# Patient Record
Sex: FEMALE | Race: WHITE | Employment: FULL TIME
[De-identification: names, ages, dates, MRNs, and addresses within clinical notes are randomized per-mention and may not be internally consistent; named-entity substitution may affect disease eponyms.]

---

## 2024-02-14 ENCOUNTER — NURSE TRIAGE (OUTPATIENT)
Dept: OTHER | Facility: CLINIC | Age: 29
End: 2024-02-14

## 2024-02-14 NOTE — TELEPHONE ENCOUNTER
Location of patient: [place]    Received call from Kasie at [facility]; Patient with Red Flag Complaint requesting to establish care with [facility].    Subjective: Caller states \"seen in ER for dizzy, nausea, High blood pressure\"     Current Symptoms: labs and urine were normal.  Got IV fluids. Blood pressure in ER was 149/86.  \"I feel really hot, but no temperature, dizzy and nauseated\"  Still able to work.      Onset: 4 weeks ago; worsening    Pain Severity: 0/10; N/A; none    Recommended disposition: See in Office Within 2 Weeks    Care advice provided, patient verbalizes understanding; denies any other questions or concerns; instructed to call back for any new or worsening symptoms.    Patient/Caller agrees with recommended disposition; writer provided warm transfer to Connie at [facility] for appointment scheduling    Attention Provider:  Thank you for allowing me to participate in the care of your patient.  The patient was connected to triage in response to information provided to the [facility].  Please do not respond through this encounter as the response is not directed to a shared pool.      Reason for Disposition   Dizziness not present now, but is a chronic symptom (recurrent or ongoing AND lasting > 4 weeks)    Protocols used: Dizziness-ADULT-[place]

## 2024-02-16 ENCOUNTER — OFFICE VISIT (OUTPATIENT)
Dept: PRIMARY CARE CLINIC | Age: 29
End: 2024-02-16
Payer: COMMERCIAL

## 2024-02-16 VITALS
WEIGHT: 202.2 LBS | DIASTOLIC BLOOD PRESSURE: 88 MMHG | TEMPERATURE: 98.8 F | OXYGEN SATURATION: 99 % | HEIGHT: 67 IN | BODY MASS INDEX: 31.74 KG/M2 | SYSTOLIC BLOOD PRESSURE: 126 MMHG | HEART RATE: 98 BPM

## 2024-02-16 DIAGNOSIS — Z72.0 VAPES NICOTINE CONTAINING SUBSTANCE: ICD-10-CM

## 2024-02-16 DIAGNOSIS — F32.A ANXIETY AND DEPRESSION: ICD-10-CM

## 2024-02-16 DIAGNOSIS — Z76.89 ENCOUNTER TO ESTABLISH CARE: Primary | ICD-10-CM

## 2024-02-16 DIAGNOSIS — F41.9 ANXIETY AND DEPRESSION: ICD-10-CM

## 2024-02-16 PROCEDURE — 99203 OFFICE O/P NEW LOW 30 MIN: CPT | Performed by: NURSE PRACTITIONER

## 2024-02-16 RX ORDER — DULOXETIN HYDROCHLORIDE 30 MG/1
30 CAPSULE, DELAYED RELEASE ORAL DAILY
Qty: 30 CAPSULE | Refills: 0 | Status: SHIPPED | OUTPATIENT
Start: 2024-02-16

## 2024-02-16 NOTE — PATIENT INSTRUCTIONS
Go to er for any thoughts of self harm or harming others  Increase fluids you need to be voiding clear yellow urine every 4 hours  Gt up slowly   No etoh with medications   Pt is agreeable to this plan

## 2024-02-16 NOTE — PROGRESS NOTES
PROGRESS NOTE  Date of Service:  2024    SUBJECTIVE:  Patient ID: Parker Murray is a 28 y.o. 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  Vapes daily  Stopped etoh 2 weeks ago   No rehab  No DUI  Some reduction in anxiety since stopping ETOH  Etoh black out 3-4 times a year 4 years   Etoh I the home  No thoughts of self harm or harming others  No previous attempts in life  Exercise-former daily not for 1.5years  Plans to begin   Diet-all food groups   Weight  wants to decreased calories and eat healthy  150 smallest   No eating disorders  Former use of meds for anxiety and depression   Smoke extinguisher in home  No co2 and extinguisher  Gun safety discussed no guns  Sleep pattern- varies  Caffeine one cup of coffee  Not currently sexually active   OBC daily from ob/gyn  Utd vision and eye exam   No additional concerns   No change in skin mole or lesion wears sunscreen   Work schedule varies shift work     History reviewed. No pertinent past medical history.   History reviewed. No pertinent surgical history.   Social History     Tobacco Use    Smoking status: Former     Current packs/day: 0.00     Average packs/day: 0.5 packs/day for 9.0 years (4.5 ttl pk-yrs)     Types: Cigarettes     Start date:      Quit date:      Years since quittin.1    Smokeless tobacco: Current   Substance Use Topics    Alcohol use: Never      Family History   Problem Relation Age of Onset    Amyotrophic lateral sclerosis Mother     Lung Cancer Paternal Grandmother      No current outpatient medications on file prior to visit.     No current facility-administered medications on file prior to visit.       No Known Allergies     Review of Systems   Constitutional:

## 2024-02-16 NOTE — ASSESSMENT & PLAN NOTE
Schedule appt with [facility]   If you would like referral let office know   Go to er for any thoughts of self harm or harming others   Pt is agreeable to this plan

## 2024-02-16 NOTE — ASSESSMENT & PLAN NOTE
We will talk in 2 weeks   Call with any issues or concerns   After medication adjustment every 6 months in office

## 2024-03-06 ENCOUNTER — TELEPHONE (OUTPATIENT)
Dept: PRIMARY CARE CLINIC | Age: 29
End: 2024-03-06

## 2024-03-06 NOTE — TELEPHONE ENCOUNTER
Pt says she is having a lot of chest tightness that comes and goes. She says she has been like this for about three days. She is wondering if it is anxiety. She says she shakes pretty badly when she experiences the chest tightness. She says that she took a couple deep breaths and she was able to calm herself down. The symptoms subsided. She says she woke up last night with the chest tightness and it felt hot. She says she was going to drive to the ER but by the time she almost got there, her symptoms had passed. She says it lasted for about 15 min and then it stopped. She made a tentative appt for 3/8/24.

## 2024-03-07 NOTE — TELEPHONE ENCOUNTER
Patient has been notified and verbalized her understanding.     No further action is needed for this encounter

## 2024-03-08 ENCOUNTER — OFFICE VISIT (OUTPATIENT)
Dept: PRIMARY CARE CLINIC | Age: 29
End: 2024-03-08
Payer: COMMERCIAL

## 2024-03-08 VITALS
BODY MASS INDEX: 31.08 KG/M2 | TEMPERATURE: 97.8 F | DIASTOLIC BLOOD PRESSURE: 74 MMHG | HEART RATE: 82 BPM | OXYGEN SATURATION: 99 % | SYSTOLIC BLOOD PRESSURE: 112 MMHG | WEIGHT: 198 LBS | HEIGHT: 67 IN | RESPIRATION RATE: 16 BRPM

## 2024-03-08 DIAGNOSIS — Z72.0 VAPES NICOTINE CONTAINING SUBSTANCE: ICD-10-CM

## 2024-03-08 DIAGNOSIS — R42 LIGHTHEADEDNESS: Primary | ICD-10-CM

## 2024-03-08 LAB
CHP ED QC CHECK: NORMAL
GLUCOSE BLD-MCNC: 73 MG/DL

## 2024-03-08 PROCEDURE — 82962 GLUCOSE BLOOD TEST: CPT | Performed by: NURSE PRACTITIONER

## 2024-03-08 PROCEDURE — 99213 OFFICE O/P EST LOW 20 MIN: CPT | Performed by: NURSE PRACTITIONER

## 2024-03-08 ASSESSMENT — ENCOUNTER SYMPTOMS
SHORTNESS OF BREATH: 0
NAUSEA: 0
VOMITING: 0
DIARRHEA: 0

## 2024-03-08 NOTE — ASSESSMENT & PLAN NOTE
Stop vaping  Bg level was low end of normal be sure to eat 1200 calories minimum with enough protein daily  Stay hydrated you need to be voiding clear yellow urine every 4 hours during the day  IF you have sob cp difficulty breathing increased lightheadedness go to er next steps may be referral to cardiology  Pt is agreeable to this plan

## 2024-03-08 NOTE — PROGRESS NOTES
PROGRESS NOTE  Date of Service:  3/8/2024    SUBJECTIVE:  Patient ID: Parker Murray is a 28 y.o. female    HPI: increaed lightheaded and dizziness has not fallen no fainting no loc  No concerns driving  Reports chest tightness. Few days ago lying flat chest felt hot felt like she couldn't breath began to breath heavy .  Drove to the ER few days go symptoms were relieved she did not go in the er  Symptoms last 15 minutes one time previous episode lasted same amt of time had nausea with this   No visual changes   Last week had a headache blurry vision X1 with nausea slept and then felt better.  Pt is not taking any medications  Recently stopped vaping   Worsening of anxiety afraid something is wrong      History reviewed. No pertinent past medical history.   History reviewed. No pertinent surgical history.   Social History     Tobacco Use    Smoking status: Former     Current packs/day: 0.00     Average packs/day: 0.5 packs/day for 9.0 years (4.5 ttl pk-yrs)     Types: Cigarettes     Start date:      Quit date:      Years since quittin.1    Smokeless tobacco: Current   Substance Use Topics    Alcohol use: Never      Family History   Problem Relation Age of Onset    Amyotrophic lateral sclerosis Mother     Lung Cancer Paternal Grandmother      Current Outpatient Medications on File Prior to Visit   Medication Sig Dispense Refill    DULoxetine (CYMBALTA) 30 MG extended release capsule Take 1 capsule by mouth daily (Patient not taking: Reported on 3/8/2024) 30 capsule 0     No current facility-administered medications on file prior to visit.       No Known Allergies     Review of Systems   HENT:  Negative for congestion.    Eyes:  Negative for visual disturbance.   Respiratory:  Negative for shortness of breath.    Cardiovascular:  Negative for chest pain and palpitations.   Gastrointestinal:  Negative for diarrhea, nausea and vomiting.   Neurological:  Positive for light-headedness. Negative for

## 2024-03-26 ENCOUNTER — OFFICE VISIT (OUTPATIENT)
Dept: FAMILY MEDICINE CLINIC | Age: 29
End: 2024-03-26
Payer: COMMERCIAL

## 2024-03-26 VITALS
WEIGHT: 196.2 LBS | HEIGHT: 67 IN | SYSTOLIC BLOOD PRESSURE: 118 MMHG | TEMPERATURE: 98.2 F | BODY MASS INDEX: 30.79 KG/M2 | DIASTOLIC BLOOD PRESSURE: 78 MMHG | HEART RATE: 79 BPM | OXYGEN SATURATION: 99 %

## 2024-03-26 DIAGNOSIS — Z23 NEED FOR VACCINATION: ICD-10-CM

## 2024-03-26 DIAGNOSIS — F41.9 ANXIETY: ICD-10-CM

## 2024-03-26 DIAGNOSIS — R42 DIZZINESS: ICD-10-CM

## 2024-03-26 DIAGNOSIS — Z00.00 PHYSICAL EXAM: Primary | ICD-10-CM

## 2024-03-26 PROCEDURE — 99395 PREV VISIT EST AGE 18-39: CPT | Performed by: FAMILY MEDICINE

## 2024-03-26 PROCEDURE — 90471 IMMUNIZATION ADMIN: CPT | Performed by: FAMILY MEDICINE

## 2024-03-26 PROCEDURE — 90715 TDAP VACCINE 7 YRS/> IM: CPT | Performed by: FAMILY MEDICINE

## 2024-03-26 RX ORDER — HYDROXYZINE PAMOATE 25 MG/1
25 CAPSULE ORAL 3 TIMES DAILY PRN
Qty: 30 CAPSULE | Refills: 0 | Status: SHIPPED | OUTPATIENT
Start: 2024-03-26 | End: 2024-04-25

## 2024-03-26 NOTE — PROGRESS NOTES
Chief Complaint: New Patient and Dizziness (lightheadedness & dizziness occurs several times throughout the day w/nausea )       HPI: She is here to establish care.    She has been having intermittent dizziness.  Usually happens when she turns her head or bends over.  She at times feels nauseous and palpitation associated with it  Denies any seizure-like activities.  She was in the ER on 2024.  She had a normal EKG and was discharged with reassurance.  However she continues to have the symptoms.    She was vaping and currently discontinued.  She has cut down completely on alcohol.  Denies having any history of drug abuse.    She also has history of anxiety.  However has been able to function without need of medication.  In the past used medication but has not taken any for long-term.    Her menstrual cycles are regular.  Never had any GYN exam done.  Denies any heavy menstrual cycle bleeding.    She is due for Tdap      Patient's problem list, medications, allergies, past medical, surgical, social and family histories were reviewed and updated as appropriate.     Current Outpatient Medications   Medication Sig Dispense Refill    hydrOXYzine pamoate (VISTARIL) 25 MG capsule Take 1 capsule by mouth 3 times daily as needed for Anxiety 30 capsule 0     No current facility-administered medications for this visit.       Social History     Tobacco Use    Smoking status: Former     Current packs/day: 0.00     Average packs/day: 0.5 packs/day for 9.0 years (4.5 ttl pk-yrs)     Types: Cigarettes     Start date:      Quit date:      Years since quittin.2    Smokeless tobacco: Current   Substance Use Topics    Alcohol use: Never            Review of Systems:  Constitutional: Negative for appetite change, fatigue, fever and unexpected weight change.   HENT: Negative   Eyes: Negative   Respiratory: Negative for cough, chest tightness, shortness of breath and wheezing.    Cardiovascular: Negative for chest pain,

## 2024-03-27 DIAGNOSIS — Z00.00 PHYSICAL EXAM: ICD-10-CM

## 2024-03-27 DIAGNOSIS — R42 DIZZINESS: ICD-10-CM

## 2024-03-27 LAB
BASOPHILS # BLD: 0.1 K/UL (ref 0–0.2)
BASOPHILS NFR BLD: 0.8 %
DEPRECATED RDW RBC AUTO: 12.2 % (ref 12.4–15.4)
EOSINOPHIL # BLD: 0.1 K/UL (ref 0–0.6)
EOSINOPHIL NFR BLD: 0.9 %
HCT VFR BLD AUTO: 41.9 % (ref 36–48)
HGB BLD-MCNC: 14.4 G/DL (ref 12–16)
LYMPHOCYTES # BLD: 1.6 K/UL (ref 1–5.1)
LYMPHOCYTES NFR BLD: 21.8 %
MCH RBC QN AUTO: 30.3 PG (ref 26–34)
MCHC RBC AUTO-ENTMCNC: 34.4 G/DL (ref 31–36)
MCV RBC AUTO: 87.9 FL (ref 80–100)
MONOCYTES # BLD: 0.3 K/UL (ref 0–1.3)
MONOCYTES NFR BLD: 4.7 %
NEUTROPHILS # BLD: 5.2 K/UL (ref 1.7–7.7)
NEUTROPHILS NFR BLD: 71.8 %
PLATELET # BLD AUTO: 307 K/UL (ref 135–450)
PMV BLD AUTO: 8.4 FL (ref 5–10.5)
RBC # BLD AUTO: 4.77 M/UL (ref 4–5.2)
WBC # BLD AUTO: 7.3 K/UL (ref 4–11)

## 2024-03-28 LAB
ANION GAP SERPL CALCULATED.3IONS-SCNC: 11 MMOL/L (ref 3–16)
BUN SERPL-MCNC: 9 MG/DL (ref 7–20)
CALCIUM SERPL-MCNC: 9.8 MG/DL (ref 8.3–10.6)
CHLORIDE SERPL-SCNC: 106 MMOL/L (ref 99–110)
CHOLEST SERPL-MCNC: 189 MG/DL (ref 0–199)
CO2 SERPL-SCNC: 25 MMOL/L (ref 21–32)
CREAT SERPL-MCNC: 0.7 MG/DL (ref 0.6–1.1)
GFR SERPLBLD CREATININE-BSD FMLA CKD-EPI: >90 ML/MIN/{1.73_M2}
GLUCOSE SERPL-MCNC: 89 MG/DL (ref 70–99)
HDLC SERPL-MCNC: 65 MG/DL (ref 40–60)
LDLC SERPL CALC-MCNC: 109 MG/DL
POTASSIUM SERPL-SCNC: 4.8 MMOL/L (ref 3.5–5.1)
SODIUM SERPL-SCNC: 142 MMOL/L (ref 136–145)
TRIGL SERPL-MCNC: 75 MG/DL (ref 0–150)
TSH SERPL DL<=0.005 MIU/L-ACNC: 1.43 UIU/ML (ref 0.27–4.2)
VLDLC SERPL CALC-MCNC: 15 MG/DL

## 2024-04-01 ENCOUNTER — HOSPITAL ENCOUNTER (OUTPATIENT)
Age: 29
Discharge: HOME | End: 2024-04-01
Payer: COMMERCIAL

## 2024-04-01 DIAGNOSIS — R42 DIZZINESS: ICD-10-CM

## 2024-04-01 PROCEDURE — 70551 MRI BRAIN STEM W/O DYE: CPT

## 2024-04-01 PROCEDURE — 93225 XTRNL ECG REC<48 HRS REC: CPT

## 2024-04-01 PROCEDURE — 93226 XTRNL ECG REC<48 HR SCAN A/R: CPT

## 2024-04-01 NOTE — PROGRESS NOTES
PATIENT EDUCATED ON 48 HOUR HOLTER MONITOR. PATIENT VERBALIZES UNDERSTANDING. ALL QUESTIONS AND CONCERNS ANSWERED    MONITOR: A  TIME: 1300

## 2024-04-03 DIAGNOSIS — R42 DIZZINESS: Primary | ICD-10-CM

## 2024-04-03 NOTE — RESULT ENCOUNTER NOTE
According to imaging department, this order would need to be changed to MRI BRAIN WITH OR WITHOUT CONTRAST.     Please correct order.

## 2024-04-05 ENCOUNTER — PATIENT MESSAGE (OUTPATIENT)
Dept: FAMILY MEDICINE CLINIC | Age: 29
End: 2024-04-05

## 2024-04-05 LAB
ACQUISITION DURATION: NORMAL S
AVERAGE HEART RATE: 73 BPM
EKG DIAGNOSIS: NORMAL
HOLTER MAX HEART RATE: 127 BPM
HOOKUP DATE: NORMAL
HOOKUP TIME: NORMAL
MAX HEART RATE TIME/DATE: NORMAL
MIN HEART RATE TIME/DATE: NORMAL
MIN HEART RATE: 50 BPM
NUMBER OF QRS COMPLEXES: NORMAL
NUMBER OF SUPRAVENTRICULAR COUPLETS: 0
NUMBER OF SUPRAVENTRICULAR ECTOPICS: 2
NUMBER OF SUPRAVENTRICULAR ISOLATED BEATS: 2
NUMBER OF VENTRICULAR BIGEMINAL CYCLES: 0
NUMBER OF VENTRICULAR COUPLETS: 0
NUMBER OF VENTRICULAR ECTOPICS: 5

## 2024-04-05 NOTE — TELEPHONE ENCOUNTER
From: Parker Murray  To: Dr. Keli Elizabeth  Sent: 4/5/2024 12:16 PM EDT  Subject: Medication     Hi, I am wondering if I can try daily medication for anxiety. I looked into Citalopram. Do I need to make an office visit to discuss medications?

## 2024-04-05 NOTE — TELEPHONE ENCOUNTER
Please advise, thank you!     Patient Phone Number: [phone] (home)     Last appt: 3/26/2024 -- PROVIDERS NOTE BELOW:  She also has history of anxiety. However has been able to function without need of medication. In the past used medication but has not taken any for long-term.     Next appt: Visit date not found

## 2024-04-10 ENCOUNTER — HOSPITAL ENCOUNTER (OUTPATIENT)
Age: 29
Discharge: HOME | End: 2024-04-10
Payer: COMMERCIAL

## 2024-04-10 DIAGNOSIS — R42 DIZZINESS: ICD-10-CM

## 2024-04-10 PROCEDURE — 6360000004 HC RX CONTRAST MEDICATION: Performed by: FAMILY MEDICINE

## 2024-04-10 PROCEDURE — A9579 GAD-BASE MR CONTRAST NOS,1ML: HCPCS | Performed by: FAMILY MEDICINE

## 2024-04-10 PROCEDURE — 70553 MRI BRAIN STEM W/O & W/DYE: CPT

## 2024-04-10 RX ADMIN — GADOTERIDOL 18 ML: 279.3 INJECTION, SOLUTION INTRAVENOUS at 13:31

## 2024-04-11 ENCOUNTER — OFFICE VISIT (OUTPATIENT)
Dept: FAMILY MEDICINE CLINIC | Age: 29
End: 2024-04-11
Payer: COMMERCIAL

## 2024-04-11 VITALS
SYSTOLIC BLOOD PRESSURE: 122 MMHG | OXYGEN SATURATION: 100 % | TEMPERATURE: 97.3 F | HEART RATE: 85 BPM | HEIGHT: 67 IN | BODY MASS INDEX: 29.88 KG/M2 | WEIGHT: 190.4 LBS | DIASTOLIC BLOOD PRESSURE: 80 MMHG

## 2024-04-11 DIAGNOSIS — G93.9 RIGHT FRONTAL LOBE LESION: Primary | ICD-10-CM

## 2024-04-11 PROCEDURE — 99213 OFFICE O/P EST LOW 20 MIN: CPT | Performed by: FAMILY MEDICINE

## 2024-04-11 RX ORDER — ONDANSETRON 4 MG/1
4 TABLET, FILM COATED ORAL EVERY 8 HOURS PRN
Qty: 30 TABLET | Refills: 0 | Status: SHIPPED | OUTPATIENT
Start: 2024-04-11 | End: 2024-05-11

## 2024-04-11 ASSESSMENT — PATIENT HEALTH QUESTIONNAIRE - PHQ9
9. THOUGHTS THAT YOU WOULD BE BETTER OFF DEAD, OR OF HURTING YOURSELF: NOT AT ALL
4. FEELING TIRED OR HAVING LITTLE ENERGY: NEARLY EVERY DAY
2. FEELING DOWN, DEPRESSED OR HOPELESS: NEARLY EVERY DAY
7. TROUBLE CONCENTRATING ON THINGS, SUCH AS READING THE NEWSPAPER OR WATCHING TELEVISION: MORE THAN HALF THE DAYS
SUM OF ALL RESPONSES TO PHQ QUESTIONS 1-9: 19
SUM OF ALL RESPONSES TO PHQ9 QUESTIONS 1 & 2: 6
3. TROUBLE FALLING OR STAYING ASLEEP: NEARLY EVERY DAY
6. FEELING BAD ABOUT YOURSELF - OR THAT YOU ARE A FAILURE OR HAVE LET YOURSELF OR YOUR FAMILY DOWN: SEVERAL DAYS
5. POOR APPETITE OR OVEREATING: NEARLY EVERY DAY
SUM OF ALL RESPONSES TO PHQ QUESTIONS 1-9: 19
8. MOVING OR SPEAKING SO SLOWLY THAT OTHER PEOPLE COULD HAVE NOTICED. OR THE OPPOSITE, BEING SO FIGETY OR RESTLESS THAT YOU HAVE BEEN MOVING AROUND A LOT MORE THAN USUAL: SEVERAL DAYS
SUM OF ALL RESPONSES TO PHQ QUESTIONS 1-9: 19
10. IF YOU CHECKED OFF ANY PROBLEMS, HOW DIFFICULT HAVE THESE PROBLEMS MADE IT FOR YOU TO DO YOUR WORK, TAKE CARE OF THINGS AT HOME, OR GET ALONG WITH OTHER PEOPLE: EXTREMELY DIFFICULT
1. LITTLE INTEREST OR PLEASURE IN DOING THINGS: NEARLY EVERY DAY
SUM OF ALL RESPONSES TO PHQ QUESTIONS 1-9: 19

## 2024-04-11 ASSESSMENT — ANXIETY QUESTIONNAIRES
5. BEING SO RESTLESS THAT IT IS HARD TO SIT STILL: NEARLY EVERY DAY
1. FEELING NERVOUS, ANXIOUS, OR ON EDGE: NEARLY EVERY DAY
GAD7 TOTAL SCORE: 19
IF YOU CHECKED OFF ANY PROBLEMS ON THIS QUESTIONNAIRE, HOW DIFFICULT HAVE THESE PROBLEMS MADE IT FOR YOU TO DO YOUR WORK, TAKE CARE OF THINGS AT HOME, OR GET ALONG WITH OTHER PEOPLE: EXTREMELY DIFFICULT
7. FEELING AFRAID AS IF SOMETHING AWFUL MIGHT HAPPEN: MORE THAN HALF THE DAYS
6. BECOMING EASILY ANNOYED OR IRRITABLE: MORE THAN HALF THE DAYS
4. TROUBLE RELAXING: NEARLY EVERY DAY
3. WORRYING TOO MUCH ABOUT DIFFERENT THINGS: NEARLY EVERY DAY
2. NOT BEING ABLE TO STOP OR CONTROL WORRYING: NEARLY EVERY DAY

## 2024-04-11 NOTE — PROGRESS NOTES
Chief Complaint: Depression (PHQ-9 Total Score: 19 (4/11/2024  2:28 PM)/Thoughts that you would be better off dead, or of hurting yourself in some way: 0 /) and Anxiety (SAMARA-7 Total Score: 19 (4/11/2024  2:28 PM); increase in frequency & duration; never started  Lexapro 10mg & didn't like how Hydroxyzine 25mg made her feel )       HPI:  Parker Murray is a 28 y.o. female here with c/o ongoing nausea, dizziness and poor appetite.  Initially she thought it was due to her anxiety.  However currently not on any medications.  She had MRI brain which did show 4.5 cm hyperintense signal in right posterior frontal white matter.  She has got an appointment with neurologist on May 21.      ROS:  Constitutional: Negative.   Respiratory: Negative for cough, chest tightness, shortness of breath and wheezing.    Cardiovascular: Negative for chest pain, palpitations and leg swelling.   Gastrointestinal: Negative   Genitourinary: Negative   Skin: Negative   Neurological: As mentioned above  Psychiatric/Behavioral: Negative     Patient's problem list, medications, allergies, past medical, surgical, social and family histories were reviewed and updated as appropriate.     Current Outpatient Medications   Medication Sig Dispense Refill    ondansetron (ZOFRAN) 4 MG tablet Take 1 tablet by mouth every 8 hours as needed for Nausea or Vomiting 30 tablet 0    escitalopram (LEXAPRO) 10 MG tablet Take 1 tablet by mouth daily (Patient not taking: Reported on 4/11/2024) 30 tablet 3    hydrOXYzine pamoate (VISTARIL) 25 MG capsule Take 1 capsule by mouth 3 times daily as needed for Anxiety (Patient not taking: Reported on 4/11/2024) 30 capsule 0     No current facility-administered medications for this visit.       Social History     Tobacco Use    Smoking status: Former     Current packs/day: 0.00     Average packs/day: 0.5 packs/day for 9.0 years (4.5 ttl pk-yrs)     Types: Cigarettes     Start date: 2014     Quit date: 2023     Years since

## 2024-04-14 ENCOUNTER — PATIENT MESSAGE (OUTPATIENT)
Dept: FAMILY MEDICINE CLINIC | Age: 29
End: 2024-04-14

## 2024-04-15 NOTE — TELEPHONE ENCOUNTER
From: Parker Murray  Sent: 4/14/2024 7:32 PM EDT  To: Tamara Freeman [facility] Clinical Staff  Subject: neurology    Yes, it does. Thank you for getting me in sooner

## 2024-04-22 ENCOUNTER — OFFICE VISIT (OUTPATIENT)
Dept: NEUROLOGY | Age: 29
End: 2024-04-22
Payer: COMMERCIAL

## 2024-04-22 VITALS
SYSTOLIC BLOOD PRESSURE: 144 MMHG | HEART RATE: 81 BPM | WEIGHT: 190 LBS | DIASTOLIC BLOOD PRESSURE: 84 MMHG | BODY MASS INDEX: 29.76 KG/M2

## 2024-04-22 DIAGNOSIS — G37.9 DEMYELINATING DISEASE (HCC): ICD-10-CM

## 2024-04-22 DIAGNOSIS — Q04.8: ICD-10-CM

## 2024-04-22 DIAGNOSIS — G37.9 DEMYELINATING DISEASE (HCC): Primary | ICD-10-CM

## 2024-04-22 PROCEDURE — 99204 OFFICE O/P NEW MOD 45 MIN: CPT | Performed by: PSYCHIATRY & NEUROLOGY

## 2024-04-22 NOTE — PATIENT INSTRUCTIONS
YOU MUST CONFIRM YOUR APPOINTMENT 1 DAY PRIOR OR IT WILL BE CANCELLED!!   Our office will call you 3 times the day prior to your appointment in an attempt to confirm.  Please return our call ASAP or confirm your appt through [employer] no later than 3 pm the day before your appointment.  If we do not hear back from you by 3 pm to confirm, your appointment will be cancelled & someone will be added into that slot from our wait list.

## 2024-04-22 NOTE — PROGRESS NOTES
The patient is a 28 y.o. years old female who  was referred by Keli Elizabeth MD for consultation regarding new onset dizziness and abnormal MRI    HPI:    Patient describes new onset dizziness 6 months ago.  Symptoms were severe and daily till about 3 weeks ago when they were less frequent.  Description initially was spinning sensation with head or body movement with nausea and ataxia.  Degree was severe and duration was hours.  No falling or injury.  No severe headache or preceding febrile illness or severe neck or back pain.  She was vaping at that time and since she stopped vaping, symptoms improved.  Further imaging with MRI brain which I reviewed showed evidence of right frontal white matter lesion with no enhancement.  She came for consultation.  Today she denies any history of visual loss or optic neuritis.  No family history of MS but does have history of ALS in her mother.  She does have occasional numbness in her feet but not severe enough to interfere with ADL.  No bladder or bowel issues or severe fatigue.  No weakness or numbness or tingling.  No chest pain, neck or back pain other review of system was unremarkable.  6 motnsh       ROS : A 10-14 system review of constitutional, cardiovascular, respiratory, GI, eyes, , ENT, musculoskeletal, endocrine, skin, SHEENT, genitourinary, psychiatric and neurologic systems was obtained and updated today and is unremarkable except as mentioned in my HPI        Exam:   Constitutional:   Vitals:    04/22/24 1145   BP: (!) 144/84   Pulse: 81   Weight: 86.2 kg (190 lb)       General appearance:  Normal development and appear in no acute distress.   Mental Status:   Oriented to person, place, problem, and time.    Memory: Good immediate recall.  Intact remote memory  Normal attention span and concentration.  Language: intact naming, repeating and fluency   Good fund of Knowledge. Aware of current events and vocabulary   Cranial Nerves:   II: Visual fields:

## 2024-04-23 LAB
ANA SER QL IA: NEGATIVE
CK SERPL-CCNC: 62 U/L (ref 26–192)
RHEUMATOID FACT SER IA-ACNC: <10 IU/ML
TSH SERPL DL<=0.005 MIU/L-ACNC: 1.34 UIU/ML (ref 0.27–4.2)
VIT B12 SERPL-MCNC: 874 PG/ML (ref 211–911)

## 2024-04-24 LAB
ALBUMIN SERPL ELPH-MCNC: 3.9 G/DL (ref 3.1–4.9)
ALPHA1 GLOB SERPL ELPH-MCNC: 0.2 G/DL (ref 0.2–0.4)
ALPHA2 GLOB SERPL ELPH-MCNC: 0.7 G/DL (ref 0.4–1.1)
B-GLOBULIN SERPL ELPH-MCNC: 0.9 G/DL (ref 0.9–1.6)
GAMMA GLOB SERPL ELPH-MCNC: 0.8 G/DL (ref 0.6–1.8)
PROT SERPL-MCNC: 6.4 G/DL (ref 6.4–8.2)
SPE/IFE INTERPRETATION: NORMAL

## 2024-04-29 ENCOUNTER — HOSPITAL ENCOUNTER (OUTPATIENT)
Age: 29
Discharge: HOME | End: 2024-04-29
Payer: COMMERCIAL

## 2024-04-29 ENCOUNTER — HOSPITAL ENCOUNTER (OUTPATIENT)
Age: 29
Discharge: HOME | End: 2024-05-01
Payer: COMMERCIAL

## 2024-04-29 VITALS
TEMPERATURE: 99.1 F | HEART RATE: 61 BPM | SYSTOLIC BLOOD PRESSURE: 125 MMHG | DIASTOLIC BLOOD PRESSURE: 76 MMHG | OXYGEN SATURATION: 100 %

## 2024-04-29 DIAGNOSIS — G37.9 DEMYELINATING DISEASE (HCC): ICD-10-CM

## 2024-04-29 DIAGNOSIS — Q04.8: ICD-10-CM

## 2024-04-29 LAB
ALBUMIN CSF-MCNC: NORMAL MG/L (ref 70–350)
ALBUMIN INDEX: NORMAL
ALBUMIN SERPL-MCNC: NORMAL G/DL (ref 3.4–5)
APPEARANCE CSF: CLEAR
BASOPHILS # BLD: 0.04 K/UL (ref 0–0.2)
BASOPHILS NFR BLD: 1 %
CLOT CHECK: NORMAL
COLOR CSF: COLORLESS
EOSINOPHIL # BLD: 0.1 K/UL (ref 0–0.6)
EOSINOPHILS RELATIVE PERCENT: 2 %
ERYTHROCYTE [DISTWIDTH] IN BLOOD BY AUTOMATED COUNT: 11.6 % (ref 12.4–15.4)
GLUCOSE CSF-MCNC: 61 MG/DL (ref 40–80)
HCT VFR BLD AUTO: 40.8 % (ref 36–48)
HGB BLD-MCNC: 13.7 G/DL (ref 12–16)
IGG CSF-MCNC: NORMAL MG/DL
IGG INDEX CSF: NORMAL
IGG SERPL-MCNC: NORMAL MG/DL
IGG SYNTHESIS RATE CSF: NORMAL MG/24 H
IMM GRANULOCYTES # BLD AUTO: 0.02 K/UL (ref 0–0.5)
IMM GRANULOCYTES NFR BLD: 0 %
INR PPP: 0.9 (ref 0.9–1.2)
LYMPHOCYTES NFR BLD: 1.74 K/UL (ref 1–5.1)
LYMPHOCYTES RELATIVE PERCENT: 29 %
MCH RBC QN AUTO: 29.6 PG (ref 26–34)
MCHC RBC AUTO-ENTMCNC: 33.6 G/DL (ref 31–36)
MCV RBC AUTO: 88.1 FL (ref 80–100)
MONOCYTES NFR BLD: 0.29 K/UL (ref 0–1.3)
MONOCYTES NFR BLD: 5 %
NEUTROPHILS NFR BLD: 64 %
NEUTS SEG NFR BLD: 3.84 K/UL (ref 1.7–7.7)
OLIGOCLONAL BANDS: NORMAL
PLATELET # BLD AUTO: 293 K/UL (ref 135–450)
PMV BLD AUTO: 9.6 FL (ref 9.4–12.4)
PROT CSF-MCNC: 26.7 MG/DL (ref 15–45)
PROTHROMBIN TIME: 12.2 SEC (ref 11.9–14.9)
RBC # BLD AUTO: 4.63 M/UL (ref 4–5.2)
RBC # FLD MANUAL: 0 CELLS/UL
SPECIMEN VOL CSF: NORMAL ML
TOTAL CELLS COUNTED BRONCH: 0 CELLS/UL (ref 0–5)
TUBE # CSF: 4
WBC OTHER # BLD: 6 K/UL (ref 4–11)

## 2024-04-29 PROCEDURE — 83873 ASSAY OF CSF PROTEIN: CPT

## 2024-04-29 PROCEDURE — 87798 DETECT AGENT NOS DNA AMP: CPT

## 2024-04-29 PROCEDURE — 2709999900 HC NON-CHARGEABLE SUPPLY

## 2024-04-29 PROCEDURE — 85025 COMPLETE CBC W/AUTO DIFF WBC: CPT

## 2024-04-29 PROCEDURE — 87205 SMEAR GRAM STAIN: CPT

## 2024-04-29 PROCEDURE — 62328 DX LMBR SPI PNXR W/FLUOR/CT: CPT

## 2024-04-29 PROCEDURE — 82042 OTHER SOURCE ALBUMIN QUAN EA: CPT

## 2024-04-29 PROCEDURE — 36415 COLL VENOUS BLD VENIPUNCTURE: CPT

## 2024-04-29 PROCEDURE — 85610 PROTHROMBIN TIME: CPT

## 2024-04-29 PROCEDURE — 70496 CT ANGIOGRAPHY HEAD: CPT

## 2024-04-29 PROCEDURE — 84157 ASSAY OF PROTEIN OTHER: CPT

## 2024-04-29 PROCEDURE — 82945 GLUCOSE OTHER FLUID: CPT

## 2024-04-29 PROCEDURE — 82784 ASSAY IGA/IGD/IGG/IGM EACH: CPT

## 2024-04-29 PROCEDURE — 6360000004 HC RX CONTRAST MEDICATION: Performed by: PSYCHIATRY & NEUROLOGY

## 2024-04-29 PROCEDURE — 82040 ASSAY OF SERUM ALBUMIN: CPT

## 2024-04-29 PROCEDURE — 89050 BODY FLUID CELL COUNT: CPT

## 2024-04-29 PROCEDURE — 87070 CULTURE OTHR SPECIMN AEROBIC: CPT

## 2024-04-29 RX ADMIN — IOPAMIDOL 75 ML: 755 INJECTION, SOLUTION INTRAVENOUS at 13:27

## 2024-04-29 ASSESSMENT — PAIN SCALES - GENERAL
PAINLEVEL_OUTOF10: 0
PAINLEVEL_OUTOF10: 0

## 2024-04-29 NOTE — PERIOP NOTE
Post procedure: patient indicates pain of 0/10, able to walk with without assistance. Appears stable and appropriate during ambulation. Went over post procedure instructions with patient, discharged home.

## 2024-04-29 NOTE — PROGRESS NOTES
Specimen collected during procedure was walked and hand delivered to lab at 1416 by TYREE Story RN.

## 2024-05-02 ENCOUNTER — TELEPHONE (OUTPATIENT)
Dept: NEUROLOGY | Age: 29
End: 2024-05-02

## 2024-05-02 LAB
MICROORGANISM SPEC CULT: NORMAL
MICROORGANISM/AGENT SPEC: NORMAL
SPECIMEN DESCRIPTION: NORMAL

## 2024-05-02 NOTE — TELEPHONE ENCOUNTER
Lab needs clarification for BRANT and Pe serum. Pt is at the lab now, Ricarda waiting for  clarification.     Please advise

## 2024-05-03 LAB
CRP SERPL-MCNC: <3 MG/L (ref 0–5.1)
MBP CSF-MCNC: 3.4 NG/ML

## 2024-05-07 LAB
MICROORGANISM SPEC CULT: NO GROWTH
MICROORGANISM/AGENT SPEC: NORMAL
SPECIMEN DESCRIPTION: NORMAL

## 2024-05-23 ENCOUNTER — OFFICE VISIT (OUTPATIENT)
Dept: FAMILY MEDICINE CLINIC | Age: 29
End: 2024-05-23
Payer: COMMERCIAL

## 2024-05-23 VITALS
DIASTOLIC BLOOD PRESSURE: 78 MMHG | HEIGHT: 67 IN | HEART RATE: 78 BPM | OXYGEN SATURATION: 100 % | TEMPERATURE: 97.3 F | WEIGHT: 193.3 LBS | SYSTOLIC BLOOD PRESSURE: 127 MMHG | BODY MASS INDEX: 30.34 KG/M2

## 2024-05-23 DIAGNOSIS — F10.10 ALCOHOL ABUSE: ICD-10-CM

## 2024-05-23 DIAGNOSIS — G93.9 RIGHT FRONTAL LOBE LESION: ICD-10-CM

## 2024-05-23 DIAGNOSIS — F41.9 ANXIETY: Primary | ICD-10-CM

## 2024-05-23 PROCEDURE — 99214 OFFICE O/P EST MOD 30 MIN: CPT | Performed by: FAMILY MEDICINE

## 2024-05-23 ASSESSMENT — PATIENT HEALTH QUESTIONNAIRE - PHQ9
2. FEELING DOWN, DEPRESSED OR HOPELESS: SEVERAL DAYS
5. POOR APPETITE OR OVEREATING: MORE THAN HALF THE DAYS
SUM OF ALL RESPONSES TO PHQ QUESTIONS 1-9: 11
3. TROUBLE FALLING OR STAYING ASLEEP: SEVERAL DAYS
SUM OF ALL RESPONSES TO PHQ QUESTIONS 1-9: 11
SUM OF ALL RESPONSES TO PHQ QUESTIONS 1-9: 11
8. MOVING OR SPEAKING SO SLOWLY THAT OTHER PEOPLE COULD HAVE NOTICED. OR THE OPPOSITE, BEING SO FIGETY OR RESTLESS THAT YOU HAVE BEEN MOVING AROUND A LOT MORE THAN USUAL: SEVERAL DAYS
1. LITTLE INTEREST OR PLEASURE IN DOING THINGS: SEVERAL DAYS
6. FEELING BAD ABOUT YOURSELF - OR THAT YOU ARE A FAILURE OR HAVE LET YOURSELF OR YOUR FAMILY DOWN: SEVERAL DAYS
4. FEELING TIRED OR HAVING LITTLE ENERGY: MORE THAN HALF THE DAYS
7. TROUBLE CONCENTRATING ON THINGS, SUCH AS READING THE NEWSPAPER OR WATCHING TELEVISION: MORE THAN HALF THE DAYS
10. IF YOU CHECKED OFF ANY PROBLEMS, HOW DIFFICULT HAVE THESE PROBLEMS MADE IT FOR YOU TO DO YOUR WORK, TAKE CARE OF THINGS AT HOME, OR GET ALONG WITH OTHER PEOPLE: SOMEWHAT DIFFICULT
9. THOUGHTS THAT YOU WOULD BE BETTER OFF DEAD, OR OF HURTING YOURSELF: NOT AT ALL
SUM OF ALL RESPONSES TO PHQ QUESTIONS 1-9: 11
SUM OF ALL RESPONSES TO PHQ9 QUESTIONS 1 & 2: 2

## 2024-05-23 NOTE — PROGRESS NOTES
Chief Complaint: Anxiety and Right frontal lobe lesion f/u       HPI:  Parker Murray is a 28 y.o. female here to follow-up on her chronic medical problems.    She had symptoms of dizziness for which she had MRI brain which did show hyperintense stable region on the right posterior frontal white matter measuring about 4.5 cm.  She had CTA brain  which was normal.  She has been followed up with Dr. Villanueva neurologist.  She had the CSF analysis and so far its negative  She feels better with her dizziness.  However she admits that she has been having problems with drinking since 2019.  She lost her mother in 2019 and later had COVID.  She picked up the habit of drinking alcohol.  She drinks beer on pack per day.  Since 60 days she was off from drinking.  However recently happened to drink with her friends.  She has strong cravings.  She wanted to know if there are any medications to help her.  However she has a planned vacation coming up with her friends and she is worried she might drink with them and continue to drink at home.  She lives with her dad who also at times drinks heavily.    She has tried Wellbutrin in the past and did not like it.  Hydroxyzine did not help her.  Does not want to start any other medication.    ROS:  Constitutional: Negative   Respiratory: Negative    Cardiovascular: Negative   Gastrointestinal: Negative   Genitourinary: Negative  Musculoskeletal: Negative  Skin: Negative  Neurological: Negative   Psychiatric/Behavioral: As mentioned above  Patient's problem list, medications, allergies, past medical, surgical, social and family histories were reviewed and updated as appropriate.     No current outpatient medications on file.     No current facility-administered medications for this visit.       Social History     Tobacco Use    Smoking status: Former     Current packs/day: 0.00     Average packs/day: 0.5 packs/day for 9.0 years (4.5 ttl pk-yrs)     Types: Cigarettes     Start date: 2014

## 2024-08-27 LAB
ALBUMIN CSF-MCNC: NORMAL MG/L (ref 70–350)
ALBUMIN INDEX: NORMAL
ALBUMIN SERPL-MCNC: NORMAL G/DL (ref 3.4–5)
IGG CSF-MCNC: NORMAL MG/DL (ref 1–3)
IGG INDEX CSF: NORMAL
IGG SERPL-MCNC: NORMAL MG/DL (ref 700–1600)
IGG SYNTHESIS RATE CSF: NORMAL MG/24 H
OLIGOCLONAL BANDS: NORMAL

## 2025-02-25 ASSESSMENT — PATIENT HEALTH QUESTIONNAIRE - PHQ9
SUM OF ALL RESPONSES TO PHQ QUESTIONS 1-9: 8
7. TROUBLE CONCENTRATING ON THINGS, SUCH AS READING THE NEWSPAPER OR WATCHING TELEVISION: NOT AT ALL
6. FEELING BAD ABOUT YOURSELF - OR THAT YOU ARE A FAILURE OR HAVE LET YOURSELF OR YOUR FAMILY DOWN: SEVERAL DAYS
SUM OF ALL RESPONSES TO PHQ QUESTIONS 1-9: 8
10. IF YOU CHECKED OFF ANY PROBLEMS, HOW DIFFICULT HAVE THESE PROBLEMS MADE IT FOR YOU TO DO YOUR WORK, TAKE CARE OF THINGS AT HOME, OR GET ALONG WITH OTHER PEOPLE: SOMEWHAT DIFFICULT
9. THOUGHTS THAT YOU WOULD BE BETTER OFF DEAD, OR OF HURTING YOURSELF: NOT AT ALL
1. LITTLE INTEREST OR PLEASURE IN DOING THINGS: SEVERAL DAYS
5. POOR APPETITE OR OVEREATING: NEARLY EVERY DAY
3. TROUBLE FALLING OR STAYING ASLEEP: NOT AT ALL
1. LITTLE INTEREST OR PLEASURE IN DOING THINGS: SEVERAL DAYS
4. FEELING TIRED OR HAVING LITTLE ENERGY: MORE THAN HALF THE DAYS
9. THOUGHTS THAT YOU WOULD BE BETTER OFF DEAD, OR OF HURTING YOURSELF: NOT AT ALL
8. MOVING OR SPEAKING SO SLOWLY THAT OTHER PEOPLE COULD HAVE NOTICED. OR THE OPPOSITE - BEING SO FIDGETY OR RESTLESS THAT YOU HAVE BEEN MOVING AROUND A LOT MORE THAN USUAL: NOT AT ALL
6. FEELING BAD ABOUT YOURSELF - OR THAT YOU ARE A FAILURE OR HAVE LET YOURSELF OR YOUR FAMILY DOWN: SEVERAL DAYS
2. FEELING DOWN, DEPRESSED OR HOPELESS: SEVERAL DAYS
SUM OF ALL RESPONSES TO PHQ QUESTIONS 1-9: 8
SUM OF ALL RESPONSES TO PHQ9 QUESTIONS 1 & 2: 2
SUM OF ALL RESPONSES TO PHQ QUESTIONS 1-9: 8
7. TROUBLE CONCENTRATING ON THINGS, SUCH AS READING THE NEWSPAPER OR WATCHING TELEVISION: NOT AT ALL
4. FEELING TIRED OR HAVING LITTLE ENERGY: MORE THAN HALF THE DAYS
2. FEELING DOWN, DEPRESSED OR HOPELESS: SEVERAL DAYS
5. POOR APPETITE OR OVEREATING: NEARLY EVERY DAY
3. TROUBLE FALLING OR STAYING ASLEEP: NOT AT ALL
10. IF YOU CHECKED OFF ANY PROBLEMS, HOW DIFFICULT HAVE THESE PROBLEMS MADE IT FOR YOU TO DO YOUR WORK, TAKE CARE OF THINGS AT HOME, OR GET ALONG WITH OTHER PEOPLE: SOMEWHAT DIFFICULT
SUM OF ALL RESPONSES TO PHQ QUESTIONS 1-9: 8
8. MOVING OR SPEAKING SO SLOWLY THAT OTHER PEOPLE COULD HAVE NOTICED. OR THE OPPOSITE, BEING SO FIGETY OR RESTLESS THAT YOU HAVE BEEN MOVING AROUND A LOT MORE THAN USUAL: NOT AT ALL

## 2025-02-26 ENCOUNTER — OFFICE VISIT (OUTPATIENT)
Dept: FAMILY MEDICINE CLINIC | Age: 30
End: 2025-02-26
Payer: COMMERCIAL

## 2025-02-26 VITALS
BODY MASS INDEX: 33.06 KG/M2 | DIASTOLIC BLOOD PRESSURE: 81 MMHG | HEIGHT: 67 IN | SYSTOLIC BLOOD PRESSURE: 126 MMHG | TEMPERATURE: 98 F | WEIGHT: 210.6 LBS | HEART RATE: 82 BPM

## 2025-02-26 DIAGNOSIS — Z00.00 PHYSICAL EXAM: Primary | ICD-10-CM

## 2025-02-26 DIAGNOSIS — E66.811 CLASS 1 OBESITY WITHOUT SERIOUS COMORBIDITY WITH BODY MASS INDEX (BMI) OF 31.0 TO 31.9 IN ADULT, UNSPECIFIED OBESITY TYPE: ICD-10-CM

## 2025-02-26 PROCEDURE — 99395 PREV VISIT EST AGE 18-39: CPT | Performed by: FAMILY MEDICINE

## 2025-02-26 RX ORDER — KETOCONAZOLE 20 MG/ML
2 SHAMPOO, SUSPENSION TOPICAL
COMMUNITY
Start: 2024-10-27

## 2025-02-26 RX ORDER — FLUOCINONIDE TOPICAL SOLUTION USP, 0.05% 0.5 MG/ML
0.05 SOLUTION TOPICAL
COMMUNITY
Start: 2024-12-01

## 2025-02-26 RX ORDER — ROFLUMILAST 3 MG/G
0.3 AEROSOL, FOAM TOPICAL
COMMUNITY
Start: 2024-10-27

## 2025-02-26 SDOH — ECONOMIC STABILITY: FOOD INSECURITY: WITHIN THE PAST 12 MONTHS, YOU WORRIED THAT YOUR FOOD WOULD RUN OUT BEFORE YOU GOT MONEY TO BUY MORE.: NEVER TRUE

## 2025-02-26 SDOH — ECONOMIC STABILITY: FOOD INSECURITY: WITHIN THE PAST 12 MONTHS, THE FOOD YOU BOUGHT JUST DIDN'T LAST AND YOU DIDN'T HAVE MONEY TO GET MORE.: NEVER TRUE

## 2025-02-26 ASSESSMENT — ANXIETY QUESTIONNAIRES
GAD7 TOTAL SCORE: 7
7. FEELING AFRAID AS IF SOMETHING AWFUL MIGHT HAPPEN: NOT AT ALL
4. TROUBLE RELAXING: MORE THAN HALF THE DAYS
3. WORRYING TOO MUCH ABOUT DIFFERENT THINGS: SEVERAL DAYS
1. FEELING NERVOUS, ANXIOUS, OR ON EDGE: SEVERAL DAYS
6. BECOMING EASILY ANNOYED OR IRRITABLE: SEVERAL DAYS
5. BEING SO RESTLESS THAT IT IS HARD TO SIT STILL: SEVERAL DAYS
2. NOT BEING ABLE TO STOP OR CONTROL WORRYING: SEVERAL DAYS
IF YOU CHECKED OFF ANY PROBLEMS ON THIS QUESTIONNAIRE, HOW DIFFICULT HAVE THESE PROBLEMS MADE IT FOR YOU TO DO YOUR WORK, TAKE CARE OF THINGS AT HOME, OR GET ALONG WITH OTHER PEOPLE: SOMEWHAT DIFFICULT

## 2025-02-26 NOTE — PROGRESS NOTES
exam  Advised to schedule GYN exam.  - CBC with Auto Differential; Future  - Lipid Panel; Future  - Comprehensive Metabolic Panel; Future    2. Class 1 obesity without serious comorbidity with body mass index (BMI) of 31.0 to 31.9 in adult, unspecified obesity type  Discussed about various options.  We will try  - Semaglutide-Weight Management (WEGOVY) 0.25 MG/0.5ML SOAJ SC injection; Inject 0.25 mg into the skin every 7 days  Dispense: 2 mL; Refill: 0           Keli Elizabeth MD  2/26/2025 1:11 PM

## 2025-06-16 DIAGNOSIS — E66.811 CLASS 1 OBESITY WITHOUT SERIOUS COMORBIDITY WITH BODY MASS INDEX (BMI) OF 31.0 TO 31.9 IN ADULT, UNSPECIFIED OBESITY TYPE: ICD-10-CM

## 2025-06-16 DIAGNOSIS — Z00.00 PHYSICAL EXAM: ICD-10-CM

## 2025-06-17 ENCOUNTER — RESULTS FOLLOW-UP (OUTPATIENT)
Dept: FAMILY MEDICINE CLINIC | Age: 30
End: 2025-06-17

## 2025-06-17 LAB
ALBUMIN SERPL-MCNC: 4.2 G/DL (ref 3.4–5)
ALBUMIN/GLOB SERPL: 2.2 {RATIO} (ref 1.1–2.2)
ALP SERPL-CCNC: 45 U/L (ref 40–129)
ALT SERPL-CCNC: 15 U/L (ref 10–40)
ANION GAP SERPL CALCULATED.3IONS-SCNC: 10 MMOL/L (ref 3–16)
AST SERPL-CCNC: 19 U/L (ref 15–37)
BASOPHILS # BLD: 0.1 K/UL (ref 0–0.2)
BASOPHILS NFR BLD: 1.1 %
BILIRUB SERPL-MCNC: 0.8 MG/DL (ref 0–1)
BUN SERPL-MCNC: 11 MG/DL (ref 7–20)
CALCIUM SERPL-MCNC: 9.5 MG/DL (ref 8.3–10.6)
CHLORIDE SERPL-SCNC: 105 MMOL/L (ref 99–110)
CHOLEST SERPL-MCNC: 168 MG/DL (ref 0–199)
CO2 SERPL-SCNC: 27 MMOL/L (ref 21–32)
CREAT SERPL-MCNC: 0.7 MG/DL (ref 0.6–1.1)
DEPRECATED RDW RBC AUTO: 12.5 % (ref 12.4–15.4)
EOSINOPHIL # BLD: 0.1 K/UL (ref 0–0.6)
EOSINOPHIL NFR BLD: 1 %
GFR SERPLBLD CREATININE-BSD FMLA CKD-EPI: >90 ML/MIN/{1.73_M2}
GLUCOSE SERPL-MCNC: 79 MG/DL (ref 70–99)
HCT VFR BLD AUTO: 38.3 % (ref 36–48)
HDLC SERPL-MCNC: 61 MG/DL (ref 40–60)
HGB BLD-MCNC: 13.1 G/DL (ref 12–16)
LDLC SERPL CALC-MCNC: 92 MG/DL
LYMPHOCYTES # BLD: 1.7 K/UL (ref 1–5.1)
LYMPHOCYTES NFR BLD: 31.2 %
MCH RBC QN AUTO: 29.4 PG (ref 26–34)
MCHC RBC AUTO-ENTMCNC: 34.4 G/DL (ref 31–36)
MCV RBC AUTO: 85.7 FL (ref 80–100)
MONOCYTES # BLD: 0.3 K/UL (ref 0–1.3)
MONOCYTES NFR BLD: 5.8 %
NEUTROPHILS # BLD: 3.2 K/UL (ref 1.7–7.7)
NEUTROPHILS NFR BLD: 60.9 %
PLATELET # BLD AUTO: 274 K/UL (ref 135–450)
PMV BLD AUTO: 8.7 FL (ref 5–10.5)
POTASSIUM SERPL-SCNC: 4.1 MMOL/L (ref 3.5–5.1)
PROT SERPL-MCNC: 6.1 G/DL (ref 6.4–8.2)
RBC # BLD AUTO: 4.46 M/UL (ref 4–5.2)
SODIUM SERPL-SCNC: 142 MMOL/L (ref 136–145)
TRIGL SERPL-MCNC: 73 MG/DL (ref 0–150)
VLDLC SERPL CALC-MCNC: 15 MG/DL
WBC # BLD AUTO: 5.3 K/UL (ref 4–11)

## 2025-08-27 ENCOUNTER — PATIENT MESSAGE (OUTPATIENT)
Dept: FAMILY MEDICINE CLINIC | Age: 30
End: 2025-08-27

## 2025-08-27 ENCOUNTER — TELEPHONE (OUTPATIENT)
Dept: FAMILY MEDICINE CLINIC | Age: 30
End: 2025-08-27